# Patient Record
Sex: MALE | Race: WHITE | NOT HISPANIC OR LATINO | Employment: FULL TIME | URBAN - METROPOLITAN AREA
[De-identification: names, ages, dates, MRNs, and addresses within clinical notes are randomized per-mention and may not be internally consistent; named-entity substitution may affect disease eponyms.]

---

## 2017-03-20 ENCOUNTER — COMMUNICATION - HEALTHEAST (OUTPATIENT)
Dept: FAMILY MEDICINE | Facility: CLINIC | Age: 47
End: 2017-03-20

## 2017-03-24 ENCOUNTER — OFFICE VISIT - HEALTHEAST (OUTPATIENT)
Dept: FAMILY MEDICINE | Facility: CLINIC | Age: 47
End: 2017-03-24

## 2017-03-24 ENCOUNTER — RECORDS - HEALTHEAST (OUTPATIENT)
Dept: GENERAL RADIOLOGY | Facility: CLINIC | Age: 47
End: 2017-03-24

## 2017-03-24 DIAGNOSIS — M75.02 ADHESIVE CAPSULITIS OF LEFT SHOULDER: ICD-10-CM

## 2017-03-24 DIAGNOSIS — G89.29 OTHER CHRONIC PAIN: ICD-10-CM

## 2017-03-24 DIAGNOSIS — M25.512 PAIN IN LEFT SHOULDER: ICD-10-CM

## 2017-03-24 ASSESSMENT — MIFFLIN-ST. JEOR: SCORE: 2237.24

## 2017-03-27 ENCOUNTER — COMMUNICATION - HEALTHEAST (OUTPATIENT)
Dept: FAMILY MEDICINE | Facility: CLINIC | Age: 47
End: 2017-03-27

## 2017-04-21 ENCOUNTER — OFFICE VISIT - HEALTHEAST (OUTPATIENT)
Dept: PHYSICAL THERAPY | Facility: REHABILITATION | Age: 47
End: 2017-04-21

## 2017-04-21 DIAGNOSIS — M25.612 SHOULDER STIFFNESS, LEFT: ICD-10-CM

## 2017-04-21 DIAGNOSIS — R29.3 ABNORMAL POSTURE: ICD-10-CM

## 2017-04-21 DIAGNOSIS — M25.612 DECREASED RANGE OF MOTION OF SHOULDER, LEFT: ICD-10-CM

## 2017-04-21 DIAGNOSIS — M25.512 LEFT SHOULDER PAIN, UNSPECIFIED CHRONICITY: ICD-10-CM

## 2018-04-17 ENCOUNTER — TELEPHONE (OUTPATIENT)
Dept: OTHER | Facility: CLINIC | Age: 48
End: 2018-04-17

## 2018-04-17 NOTE — TELEPHONE ENCOUNTER
4/17/2018    Call Regarding Onboarding [facility] Other    Attempt 1    Message on voicemail     Comments:       Outreach   SV

## 2018-05-11 NOTE — TELEPHONE ENCOUNTER
5/11/2018    Call Regarding [facility]boarding [facility] [facility] other     Attempt 2    Message on voicemail    Comments:       Outreach   Smitha Ortega

## 2018-06-05 NOTE — TELEPHONE ENCOUNTER
06/05/2018      Call Regarding [facility] other    Attempt 3    Message on voicemail    Comments:       Outreach   Veronica Ramey

## 2018-10-30 ENCOUNTER — OFFICE VISIT - HEALTHEAST (OUTPATIENT)
Dept: FAMILY MEDICINE | Facility: CLINIC | Age: 48
End: 2018-10-30

## 2018-10-30 DIAGNOSIS — J39.2 LESION OF THROAT: ICD-10-CM

## 2020-01-02 ENCOUNTER — OFFICE VISIT - HEALTHEAST (OUTPATIENT)
Dept: FAMILY MEDICINE | Facility: CLINIC | Age: 50
End: 2020-01-02

## 2020-01-02 ENCOUNTER — COMMUNICATION - HEALTHEAST (OUTPATIENT)
Dept: TELEHEALTH | Facility: CLINIC | Age: 50
End: 2020-01-02

## 2020-01-02 DIAGNOSIS — M25.842 CYST OF JOINT OF LEFT HAND: ICD-10-CM

## 2020-01-02 ASSESSMENT — MIFFLIN-ST. JEOR: SCORE: 2262.19

## 2020-01-06 ENCOUNTER — RECORDS - HEALTHEAST (OUTPATIENT)
Dept: ADMINISTRATIVE | Facility: OTHER | Age: 50
End: 2020-01-06

## 2020-07-02 ENCOUNTER — TELEPHONE (OUTPATIENT)
Dept: TELEHEALTH | Age: 50
End: 2020-07-02

## 2020-07-02 ENCOUNTER — LAB SERVICES (OUTPATIENT)
Dept: LAB | Age: 50
End: 2020-07-02

## 2020-07-02 DIAGNOSIS — Z20.822 SUSPECTED COVID-19 VIRUS INFECTION: Primary | ICD-10-CM

## 2020-07-02 DIAGNOSIS — Z20.822 SUSPECTED COVID-19 VIRUS INFECTION: ICD-10-CM

## 2020-07-04 LAB — SARS-COV-2 N GENE RESP QL NAA+PROBE: NEGATIVE

## 2020-07-14 ENCOUNTER — TELEPHONE (OUTPATIENT)
Dept: TELEHEALTH | Age: 50
End: 2020-07-14

## 2020-07-14 DIAGNOSIS — Z20.822 SUSPECTED COVID-19 VIRUS INFECTION: Primary | ICD-10-CM

## 2020-07-16 ENCOUNTER — LAB SERVICES (OUTPATIENT)
Dept: LAB | Age: 50
End: 2020-07-16

## 2020-07-16 DIAGNOSIS — Z20.822 SUSPECTED COVID-19 VIRUS INFECTION: ICD-10-CM

## 2020-07-19 LAB — SARS-COV-2 N GENE RESP QL NAA+PROBE: NEGATIVE

## 2021-05-30 VITALS — BODY MASS INDEX: 40.23 KG/M2 | WEIGHT: 297 LBS | HEIGHT: 72 IN

## 2021-06-02 VITALS — BODY MASS INDEX: 43.73 KG/M2 | WEIGHT: 315 LBS

## 2021-06-03 VITALS
WEIGHT: 302.5 LBS | HEART RATE: 87 BPM | SYSTOLIC BLOOD PRESSURE: 124 MMHG | OXYGEN SATURATION: 95 % | BODY MASS INDEX: 40.97 KG/M2 | HEIGHT: 72 IN | DIASTOLIC BLOOD PRESSURE: 74 MMHG

## 2021-06-04 NOTE — PROGRESS NOTES
"Assessment/Plan:      Problem List Items Addressed This Visit     None      Visit Diagnoses     Cyst of joint of left hand    -  Primary    Relevant Orders    Ambulatory referral to Orthopedics      I am suspicious of a synovial cyst but may also be a tendon nodule. Given that the nodule is bothering the patient, I am recommending evaluation by an orthopedist. The nodule can't simply be removed here due to potential tendon/joint involvement.    Patient Instructions   I would recommend evaluation by an orthopedist. You should get a call to schedule.       Subjective:   Mauricio Thorpe is a 49 y.o. male who presents today complaining of a lump in the palm of his left hand for 8-9 months. It is increasingly bothersome and he would like it removed. No problems with moving his fingers. No numbness or tingling in the hand.    Patient Active Problem List   Diagnosis     Chronic left shoulder pain      History reviewed. No pertinent past medical history.  Past Surgical History:   Procedure Laterality Date     APPENDECTOMY         Review of Systems      Objective:     Vitals:    01/02/20 0948   BP: 124/74   Pulse: 87   SpO2: 95%   Weight: (!) 302 lb 8 oz (137.2 kg)   Height: 5' 11.5\" (1.816 m)   PainSc:   1   PainLoc: Hand       Physical Exam  Musculoskeletal:      Comments: Left hand: Normal  strength and range of motion of the fingers. There is a 1 cm nodule on the palmar surface proximal to the 4th digit. Nodule does not move with movement of the finger. No erythema or discomfort.       "

## 2021-06-09 NOTE — PROGRESS NOTES
"Assessment/Plan:  Mauricio was seen today for shoulder pain.    Diagnoses and all orders for this visit:    Adhesive capsulitis of left shoulder: The patient is a physical exam most consistent with adhesive capsulitis.  X-ray was normal/negative.  Recommending physical therapy and we did talk but did not initiate an injection of corticosteroids into the joint.  The patient due to his travel is unlikely to be able to perform physical therapy more than once or twice.  Went through exercise that he can complete at home which might be helpful.  He is not making good progress, consider referral to orthopedics or insist upon more diligence with his physical therapy.  -     XR Shoulder Left 2 or More VWS; Future  -     Ambulatory referral to Physical Therapy    Return in about 3 months (around 6/24/2017) for Annual physical.    Michele Sheffield MD  _______________________________    Chief Complaint   Patient presents with     Shoulder Pain     left shoulder x 3 months      Subjective: Mauricio Thorpe is a 47 y.o. year old male who I have not seen in clinic before who presents with the following acute complaint(s):    Shoulder pain:   - duration: three months   - left shoulder   - cannot move it above head   - No obvious injury   - not getting better   - can loosen it up   - palliative: unsure   - no weakness.   - work: .      ROS: Complete review of systems obtained.  Pertinent items are listed above.     The following portions of the patient's history were reviewed and updated as appropriate: allergies, current medications, past family history, past medical history, past social history, past surgical history and problem list.    Objective:    height is 5' 11.5\" (1.816 m) and weight is 297 lb (134.7 kg) (abnormal). His oral temperature is 98.6  F (37  C). His blood pressure is 118/64 and his pulse is 84. His respiration is 26 and oxygen saturation is 98%.   General: No acute distress  MSK: " Both active and passive range of motion is diminished on the left side.  As I try to manipulate his left shoulder there is resistance that is painful to the patient.  There is no tenderness to palpation over the scapular ridge bilaterally.  No tenderness to palpation over the clavicle or the AC joint bilaterally.  Proximal humeral head insertion of the biceps tendon is nontender bilaterally.  Strength is 5 out of 5 bilaterally.  Radial pulses are normal bilaterally.  Reflexes 2+ at the biceps bilaterally.  Positive Steward.  Positive Neer's.  Positive empty can testing.  No pain with internal/external rotation.  psych: Flat affect.    Shoulder x-ray: Left shoulder does not show any fracture.  Appears well located with respect to the glenoid fossa.  No abnormalities by my personal interpretation.    No results found for this or any previous visit (from the past 24 hour(s)).  Xr Shoulder Left 2 Or More Vws    Result Date: 3/24/2017  XR SHOULDER LEFT 2 OR MORE VWS 3/24/2017 4:35 PM INDICATION: Pain in left shoulder  lt shoulder pn when abducting and drawing lt arm behind back x3wks. No known injury or any prev injury in past. COMPARISON: None. FINDINGS: Normal alignment without evidence of a fracture or dislocation. Adjacent chest wall is unremarkable. Crohn the humeral space and AC joint are normal. This report was electronically interpreted by: Dr. Maitlde Chester MD ON 03/24/2017 at 17:01    Additional History from Old Records Summarized (2): no  Decision to Obtain Records (1): no  Radiology Tests Summarized or Ordered (1): yes  Labs Reviewed or Ordered (1): no  Medicine Test Summarized or Ordered (1): no  Independent Review of EKG or X-RAY(2 each): yes    This note has been dictated using voice recognition software. Any grammatical or context distortions are unintentional and inherent to the software

## 2021-06-10 NOTE — PROGRESS NOTES
[facility]   Shoulder Initial Evaluation    Patient Name: Mauricio Thorpe  Date of evaluation: 4/21/2017  Referral Diagnosis: Adhesive capsulitis of left shoulder  Referring provider: Michele Sheffield MD  Visit Diagnosis:     ICD-10-CM    1. Left shoulder pain, unspecified chronicity M25.512    2. Decreased range of motion of shoulder, left M25.612    3. Shoulder stiffness, left M25.612    4. Abnormal posture R29.3        Assessment:     Mauricio Thorpe is a 47 y.o. male who presents to therapy today with chief complaints of L shoulder pain and stiffness that has been ongoing since December of 2016 with an insidious onset. Pt has significant limitations with L shoulder ROM with capsular pattern most affected (ER, abduction, IR). ROM has improved since beginning stretching routine about a month ago. Pt demonstrates functional strength within his ROM and no significant pain with shoulder special tests. Active and passive ROM were both limited showing signs of adhesive capsulitis. Pt's work schedule will limit PT, but educated the importance of his stretches for improvements to be made. Pt's overall function is limited due to the above impairments and would benefit from PT for furthering increasing shoulder ROM. Pt reports no other significant past medical history.  Pain symptoms are improving since doctor visit.  Functional impairments include reaching, lifting, sleeping, bathing, dressing, ADLs, and bed mobility.  Pt demo's signs and sx consistent with adhesive capsulitis.     Impairments in  pain, posture, ROM, joint mobility, strength, ADL's  The POC is dynamic and will be modified on an ongoing basis.  Patient will return to clinic if symptoms persist.  Barriers to achieving goals as noted in the assessment section may affect outcome.  Prognosis to achieve goals is  good   Skilled PT is required to improve shoulder ROM, mobility, strength, posture, ADL function, and reduce pain.  Pt. is  appropriate for skilled PT intervention as outlined in the Plan of Care (POC).  Pt. is a good candidate for skilled PT services to improve pain levels and function.    Goals:  Pt. will have improved quality of sleep: with less pain;waking less times/night;in 6 weeks  Pt will: be independent with HEP within 3 weeks.  Pt will: be able to reach overhead to 120 degrees or more pain-free within 8 weeks.  Pt will: decrease SPADI by 9 points to demonstrate improved function within 8 weeks.  Pt will: be able to dress and bathe with less difficulty and 2/10 pain or less within 8 weeks.    Patient's expectations/goals are realistic.    Barriers to Learning or Achieving Goals:  No Barriers.       Plan / Patient Instructions:        Plan of Care:   Communication with: Referral Source  Patient Related Instruction: Nature of Condition;Body mechanics;Posture;Treatment plan and rationale;Precautions;Next steps;Self Care instruction;Expected outcome  Times per Week: 1  Number of Weeks: 3-4  Number of Visits: 3-4  Discharge Planning: Pt will be discharged when all goals are met, lack of progress or wosening condition, MD referral, and/or pt desires to continue with HEP independently   Precautions / Restrictions : none  Therapeutic Exercise: ROM;Stretching;Strengthening  Neuromuscular Reeducation: posture;core;kinesio tape  Manual Therapy: soft tissue mobilization;myofascial release;joint mobilization;muscle energy;strain counterstrain  Modalities: electrical stimulation;TENS;iontophoresis;ultrasound;cold pack;hot pack  Functional Training (ADL's): self care;meal prep;ADL's;ergonomics;instructions for equipment;safety procedures;instructions in transfers  Equipment: theraband;TENS unit    POC and pathology of condition were reviewed with patient.  Pt. is in agreement with the Plan of Care  A Home Exercise Program (HEP) was initiated today.  Pt. was instructed in exercises by PT and patient was given a handout with detailed  "instructions.  Plan for next visit: review HEP, continue GH mobilizations as able, shoulder stretching, postural stretching and strengthening, doorway pec stretch  .   Subjective:    Social information:   Occupation: electric controlengineer   Work Status:Working full time   Equipment Available: None    History of Present Illness:    Mauricio is a 47 y.o. male who presents to therapy today with complaints of L shoulder pain and stiffness that has been worsening since 2015. Pt does not recall an injury. Pt says that he has been doing some stretching that do help with some of his pain. Symptoms are intermittent and getting better. He denies history of similar symptoms. He describes their previous level of function as not limited.    Pain Ratin  Pain rating at best: 0  Pain rating at worst: 10  Pain description: sharp and stiffness, \"worst pain I have ever felt in my life\"    Functional limitations are described as occurring with:   bed mobility  lifting  personal cares donning shirt or jacket and washing body  reaching at shoulder height, overhead and behind back  performing routine daily activities  sleeping    Patient reports benefit from:  stretching       Objective:      Note: Items left blank indicates the item was not performed or not indicated at the time of the evaluation.    Patient Outcome Measures :    Shoulder Pain and Disability Index (SPADI) in %: 39   Scores range from 0-100%, where a score of 0% represents minimal pain and maximal function. The minimal clincically important difference is a score reduction of 10%.    Shoulder Examination  1. Left shoulder pain, unspecified chronicity     2. Decreased range of motion of shoulder, left     3. Shoulder stiffness, left     4. Abnormal posture       Involved side: Left  Posture Observation:      General sitting posture is  poor.  General standing posture is poor.  Cervical:  Mild forward head  Cervical Clearing: Normal    Shoulder/Elbow ROM  "   Date: 4/21/17     Shoulder and Elbow ROM ( )   AROM in degrees AROM in degrees AROM in degrees    Right Left Right Left Right Left   Shoulder Flexion (0-180 )  deg       Shoulder Abduction (0-180 ) WFL 90 deg       Shoulder Extension (0-60 )         Shoulder ER (0-90 )  5 deg       Shoulder IR (0-70 )  22 deg       Shoulder IR/Ext T8 L2       Elbow Flexion (150 ) WFL WFL       Elbow Extension (0 ) WFL WFL        PROM in degrees PROM in degrees PROM in degrees    Right Left Right Left Right Left   Shoulder Flexion (0-180 )  120 deg       Shoulder Abduction (0-180 )  100 deg       Shoulder Extension (0-60 )         Shoulder ER (0-90 )  8 deg       Shoulder IR (0-70 )  25 deg       Elbow Flexion (150 )         Elbow Extension (0 )           Shoulder/Elbow Strength   Date: 4/21/17     Shoulder/Elbow Strength (/5)  Manual Muscle Test (MMT) MMT MMT MMT    Right Left Right Left Right Left   Shoulder Flexion 5 5       Supraspinatus 5 5       Shoulder Abduction 5 5-       Shoulder Extension         Shoulder External Rotation 5 5       Shoulder Internal Rotation 5 5       Elbow Flexion 5 5       Elbow Extension 5 5       Other:         Other:           Flexibility: significant stiffness in L shoulder, most decreased in IR/ER and abduction    Palpation: no tenderness    Joint Assessment:  Sternoclavicular Joint Assessment: WNL  Acromioclavicular Joint Assessment: Not Indicated  Scapulothoracic Joint Assessment: WNL.  Glenohumeral Joint Assessment:Hypomobile. Pt had difficulty relaxing during assessment    Shoulder Special Tests     Impingement Cluster Right (+/-) Left (+/-) Rotator Cuff Tests Right (+/-) Left (+/-)   Steward-Roberto  - Drop Arm Sign     Painful Arc  + Hornblowers     Infraspinatus Test   ERLS     AC Tests Right (+/-) Left (+/-) IRLS     Active Compression   Labral Tests Right (+/-) Left (+/-)   Crossbody Adduction   Biceps Load Test II     AC Resisted Extension   Jerk Test     GH Instability Tests  Right (+/-) Left (+/-) Deisy Test     Sulcus Sign   Biceps Tests Right (+/-) Left (+/-)   Apprehension   Speed     Relocation   Trudi martinez     Other:   Other:     Other:   Other:         Treatment Today    TREATMENT MINUTES COMMENTS   Evaluation 30    Self-care/ Home management     Manual therapy 15 In supine:  - grade 3 and 4 inferior and posterior L GH mobilizations for mobility  - GH distraction for relaxation, pt had difficulty relaxing with mobilizations  - PROM of L flexion, abduction, IR/ER   Neuromuscular Re-education     Therapeutic Activity     Therapeutic Exercises 10 See exercises for HEP. Pt educated on the importance of HEP and normal stretching. May trial ice or heat for comfort. Also, told to try getting pulley system for ROM.   Gait training     Modality__________________                Total 55    Blank areas are intentional and mean the treatment did not include these items.     PT Evaluation Code: (Please list factors)  Patient History/Comorbidities: none  Examination: L shoulder pain and stiffness, poor posture, muscle tightness  Clinical Presentation: stable  Clinical Decision Making: low    Patient History/  Comorbidities Examination  (body structures and functions, activity limitations, and/or participation restrictions) Clinical Presentation Clinical Decision Making (Complexity)   No documented Comorbidities or personal factors 1-2 Elements Stable and/or uncomplicated Low   1-2 documented comorbidities or personal factor 3 Elements Evolving clinical presentation with changing characteristics Moderate   3-4 documented comorbidities or personal factors 4 or more Unstable and unpredictable High          Gurpreet Rutledge, PT ,DPT  4/21/2017  2:45 PM

## 2021-06-11 NOTE — PROGRESS NOTES
[facility] Discharge Summary  Patient Name: Mauricio Thorpe  Date: 6/23/2017  Referral Diagnosis: Adhesive capsulitis of left shoulder  Referring provider: Michele Sheffield MD  Visit Diagnosis:   1. Left shoulder pain, unspecified chronicity     2. Decreased range of motion of shoulder, left     3. Shoulder stiffness, left     4. Abnormal posture         Goals:  Pt. will have improved quality of sleep: with less pain;waking less times/night;in 6 weeks  Pt will: be independent with HEP within 3 weeks. (Unable to determine)  Pt will: be able to reach overhead to 120 degrees or more pain-free within 8 weeks. (Unable to determine)  Pt will: decrease SPADI by 9 points to demonstrate improved function within 8 weeks. (Unable to determine)  Pt will: be able to dress and bathe with less difficulty and 2/10 pain or less within 8 weeks. (Unable to determine.)    Patient was seen for one visit on 4/21/17 and did not return to PT.  Patient received a home program   The patient discontinued therapy, did not return.  No further therapy is required at this time.  Pt limited with PT due to work schedule and did not return after given several exercises for HEP.    Therapy will be discontinued at this time.  The patient will need a new referral to resume.    Thank you for your referral.  Gurpreet Rutledge, PT, DPT  6/23/2017  2:41 PM

## 2021-06-15 PROBLEM — G89.29 CHRONIC LEFT SHOULDER PAIN: Status: ACTIVE | Noted: 2017-03-24

## 2021-06-15 PROBLEM — M25.512 CHRONIC LEFT SHOULDER PAIN: Status: ACTIVE | Noted: 2017-03-24

## 2021-06-21 NOTE — PROGRESS NOTES
Assessment/Plan:      Problem List Items Addressed This Visit     None      Visit Diagnoses     Lesion of throat    -  Primary      Lesion appears to be a small cyst or other similar pathology.  It has a benign appearance.  Given that it has been there for several months and actually seems smaller to the patient, no further workup needed at this time.  Patient reassured.  He was asked to return if it is increasing in size or if it is bleeding.  I did offer referral to ENT if he would like but he deferred at this time.    Subjective:   Mauricio Thorpe is a 48 y.o. male who presents today complaining of a lump he noticed in his posterior left tonsillar bed.  He first noticed it in June of this year.  It doesn't hurt. It actually seems a bit smaller now than it was this summer.  No history of smoking or chewing tobacco use.  The area is not bleeding.    Patient Active Problem List   Diagnosis     Chronic left shoulder pain      No past medical history on file.  Past Surgical History:   Procedure Laterality Date     APPENDECTOMY             Review of Systems  Pertinent items are noted in HPI.  ROS otherwise negative.    Objective:     Vitals:    10/30/18 1301   BP: 136/80   Pulse: 94   Temp: 98.1  F (36.7  C)   TempSrc: Oral   Weight: (!) 318 lb (144.2 kg)       Physical Exam   Constitutional: He appears well-nourished. No distress.   HENT:   Tonsillar beds appear normal other than a small cystic lesion on his left tonsillar bed.  Well-circumscribed and appears benign.   Neck: Normal range of motion. Neck supple.   Cardiovascular: Normal rate and regular rhythm.    No murmur heard.  Pulmonary/Chest: Effort normal and breath sounds normal. No respiratory distress. He has no wheezes. He has no rales.   Lymphadenopathy:     He has no cervical adenopathy.

## 2021-07-23 ENCOUNTER — HOSPITAL ENCOUNTER (EMERGENCY)
Age: 51
Discharge: HOME | End: 2021-07-24
Attending: EMERGENCY MEDICINE

## 2021-07-23 ENCOUNTER — APPOINTMENT (OUTPATIENT)
Dept: CT IMAGING | Age: 51
End: 2021-07-23
Attending: EMERGENCY MEDICINE

## 2021-07-23 ENCOUNTER — APPOINTMENT (OUTPATIENT)
Dept: GENERAL RADIOLOGY | Age: 51
End: 2021-07-23
Attending: EMERGENCY MEDICINE

## 2021-07-23 DIAGNOSIS — S40.012A CONTUSION OF LEFT SHOULDER, INITIAL ENCOUNTER: ICD-10-CM

## 2021-07-23 DIAGNOSIS — R55 SYNCOPE AND COLLAPSE: ICD-10-CM

## 2021-07-23 DIAGNOSIS — S00.81XA ABRASION OF FACE, INITIAL ENCOUNTER: ICD-10-CM

## 2021-07-23 DIAGNOSIS — S01.81XA LACERATION OF FOREHEAD, INITIAL ENCOUNTER: Primary | ICD-10-CM

## 2021-07-23 DIAGNOSIS — E86.0 DEHYDRATION: ICD-10-CM

## 2021-07-23 LAB
ANION GAP SERPL CALC-SCNC: 16 MMOL/L (ref 10–20)
BASOPHILS # BLD: 0 K/MCL (ref 0–0.3)
BASOPHILS NFR BLD: 0 %
BUN SERPL-MCNC: 25 MG/DL (ref 6–20)
BUN/CREAT SERPL: 16 (ref 7–25)
CALCIUM SERPL-MCNC: 9.8 MG/DL (ref 8.4–10.2)
CHLORIDE SERPL-SCNC: 103 MMOL/L (ref 98–107)
CO2 SERPL-SCNC: 28 MMOL/L (ref 21–32)
CREAT SERPL-MCNC: 1.57 MG/DL (ref 0.67–1.17)
DEPRECATED RDW RBC: 42.7 FL (ref 39–50)
EOSINOPHIL # BLD: 0.2 K/MCL (ref 0–0.5)
EOSINOPHIL NFR BLD: 2 %
ERYTHROCYTE [DISTWIDTH] IN BLOOD: 13 % (ref 11–15)
FASTING DURATION TIME PATIENT: ABNORMAL H
GFR SERPLBLD BASED ON 1.73 SQ M-ARVRAT: 51 ML/MIN/1.73M2
GLUCOSE SERPL-MCNC: 95 MG/DL (ref 65–99)
HCT VFR BLD CALC: 46 % (ref 39–51)
HGB BLD-MCNC: 15.4 G/DL (ref 13–17)
IMM GRANULOCYTES # BLD AUTO: 0 K/MCL (ref 0–0.2)
IMM GRANULOCYTES # BLD: 0 %
LYMPHOCYTES # BLD: 2.1 K/MCL (ref 1–4.8)
LYMPHOCYTES NFR BLD: 19 %
MAGNESIUM SERPL-MCNC: 2.4 MG/DL (ref 1.7–2.4)
MCH RBC QN AUTO: 30.4 PG (ref 26–34)
MCHC RBC AUTO-ENTMCNC: 33.5 G/DL (ref 32–36.5)
MCV RBC AUTO: 90.9 FL (ref 78–100)
MONOCYTES # BLD: 0.8 K/MCL (ref 0.3–0.9)
MONOCYTES NFR BLD: 7 %
NEUTROPHILS # BLD: 7.8 K/MCL (ref 1.8–7.7)
NEUTROPHILS NFR BLD: 72 %
NRBC BLD MANUAL-RTO: 0 /100 WBC
PLATELET # BLD AUTO: 229 K/MCL (ref 140–450)
POTASSIUM SERPL-SCNC: 3.8 MMOL/L (ref 3.4–5.1)
RBC # BLD: 5.06 MIL/MCL (ref 4.5–5.9)
SODIUM SERPL-SCNC: 143 MMOL/L (ref 135–145)
TROPONIN I SERPL HS-MCNC: <0.02 NG/ML
TROPONIN I SERPL HS-MCNC: <0.02 NG/ML
WBC # BLD: 10.9 K/MCL (ref 4.2–11)

## 2021-07-23 PROCEDURE — 83735 ASSAY OF MAGNESIUM: CPT | Performed by: EMERGENCY MEDICINE

## 2021-07-23 PROCEDURE — 36415 COLL VENOUS BLD VENIPUNCTURE: CPT

## 2021-07-23 PROCEDURE — 10002807 HB RX 258: Performed by: EMERGENCY MEDICINE

## 2021-07-23 PROCEDURE — 84484 ASSAY OF TROPONIN QUANT: CPT | Performed by: EMERGENCY MEDICINE

## 2021-07-23 PROCEDURE — 96360 HYDRATION IV INFUSION INIT: CPT

## 2021-07-23 PROCEDURE — 10002801 HB RX 250 W/O HCPCS: Performed by: EMERGENCY MEDICINE

## 2021-07-23 PROCEDURE — 90471 IMMUNIZATION ADMIN: CPT | Performed by: EMERGENCY MEDICINE

## 2021-07-23 PROCEDURE — 99285 EMERGENCY DEPT VISIT HI MDM: CPT | Performed by: EMERGENCY MEDICINE

## 2021-07-23 PROCEDURE — 10002800 HB RX 250 W HCPCS: Performed by: EMERGENCY MEDICINE

## 2021-07-23 PROCEDURE — 90715 TDAP VACCINE 7 YRS/> IM: CPT | Performed by: EMERGENCY MEDICINE

## 2021-07-23 PROCEDURE — 71045 X-RAY EXAM CHEST 1 VIEW: CPT

## 2021-07-23 PROCEDURE — G1004 CDSM NDSC: HCPCS | Performed by: RADIOLOGY

## 2021-07-23 PROCEDURE — G1004 CDSM NDSC: HCPCS

## 2021-07-23 PROCEDURE — 96361 HYDRATE IV INFUSION ADD-ON: CPT

## 2021-07-23 PROCEDURE — 80048 BASIC METABOLIC PNL TOTAL CA: CPT | Performed by: EMERGENCY MEDICINE

## 2021-07-23 PROCEDURE — 71045 X-RAY EXAM CHEST 1 VIEW: CPT | Performed by: RADIOLOGY

## 2021-07-23 PROCEDURE — 73030 X-RAY EXAM OF SHOULDER: CPT

## 2021-07-23 PROCEDURE — 85025 COMPLETE CBC W/AUTO DIFF WBC: CPT | Performed by: EMERGENCY MEDICINE

## 2021-07-23 PROCEDURE — 99285 EMERGENCY DEPT VISIT HI MDM: CPT

## 2021-07-23 PROCEDURE — 93005 ELECTROCARDIOGRAM TRACING: CPT | Performed by: EMERGENCY MEDICINE

## 2021-07-23 PROCEDURE — 70450 CT HEAD/BRAIN W/O DYE: CPT | Performed by: RADIOLOGY

## 2021-07-23 PROCEDURE — 70450 CT HEAD/BRAIN W/O DYE: CPT

## 2021-07-23 PROCEDURE — 73030 X-RAY EXAM OF SHOULDER: CPT | Performed by: RADIOLOGY

## 2021-07-23 PROCEDURE — 93010 ELECTROCARDIOGRAM REPORT: CPT | Performed by: EMERGENCY MEDICINE

## 2021-07-23 PROCEDURE — 10002526 HB LACERATION REPAIR-SIMPLE

## 2021-07-23 RX ORDER — LIDOCAINE HYDROCHLORIDE AND EPINEPHRINE 10; 10 MG/ML; UG/ML
1 INJECTION, SOLUTION INFILTRATION; PERINEURAL ONCE
Status: COMPLETED | OUTPATIENT
Start: 2021-07-23 | End: 2021-07-23

## 2021-07-23 RX ADMIN — LIDOCAINE HYDROCHLORIDE,EPINEPHRINE BITARTRATE 1 ML: 10; .01 INJECTION, SOLUTION INFILTRATION; PERINEURAL at 22:16

## 2021-07-23 RX ADMIN — SODIUM CHLORIDE, POTASSIUM CHLORIDE, SODIUM LACTATE AND CALCIUM CHLORIDE 1000 ML: 600; 310; 30; 20 INJECTION, SOLUTION INTRAVENOUS at 21:38

## 2021-07-23 RX ADMIN — TETANUS TOXOID, REDUCED DIPHTHERIA TOXOID AND ACELLULAR PERTUSSIS VACCINE, ADSORBED 0.5 ML: 5; 2.5; 8; 8; 2.5 SUSPENSION INTRAMUSCULAR at 21:38

## 2021-07-23 RX ADMIN — SODIUM CHLORIDE, POTASSIUM CHLORIDE, SODIUM LACTATE AND CALCIUM CHLORIDE 1000 ML: 600; 310; 30; 20 INJECTION, SOLUTION INTRAVENOUS at 20:05

## 2021-07-23 ASSESSMENT — HEART SCORE
TROPONIN: EQUAL OR LESS THAN NORMAL LIMIT
RISK FACTORS: 1-2 RISK FACTORS
HISTORY: SLIGHTLY SUSPICIOUS
HEART SCORE: 2
EKG: NORMAL
AGE: GREATER THAN 45 TO LESS THAN 65

## 2021-07-23 ASSESSMENT — PAIN SCALES - GENERAL: PAINLEVEL_OUTOF10: 5

## 2021-07-23 ASSESSMENT — PAIN DESCRIPTION - PAIN TYPE: TYPE: ACUTE PAIN

## 2021-07-24 LAB
RAINBOW EXTRA TUBES HOLD SPECIMEN: NORMAL

## 2021-07-26 ENCOUNTER — WALK IN (OUTPATIENT)
Dept: URGENT CARE | Age: 51
End: 2021-07-26

## 2021-07-26 VITALS
HEART RATE: 94 BPM | WEIGHT: 140.4 LBS | DIASTOLIC BLOOD PRESSURE: 68 MMHG | RESPIRATION RATE: 14 BRPM | SYSTOLIC BLOOD PRESSURE: 116 MMHG | BODY MASS INDEX: 24.1 KG/M2 | TEMPERATURE: 98.1 F | OXYGEN SATURATION: 99 %

## 2021-07-26 DIAGNOSIS — S06.0X1D CONCUSSION WITH LOSS OF CONSCIOUSNESS OF 30 MINUTES OR LESS, SUBSEQUENT ENCOUNTER: Primary | ICD-10-CM

## 2021-07-26 LAB
ATRIAL RATE (BPM): 61
DIASTOLIC BLOOD PRESSURE: 92
P AXIS (DEGREES): 71
PR-INTERVAL (MSEC): 142
QRS-INTERVAL (MSEC): 108
QT-INTERVAL (MSEC): 434
QTC: 437
R AXIS (DEGREES): 71
REPORT TEXT: NORMAL
SYSTOLIC BLOOD PRESSURE: 131
T AXIS (DEGREES): 58
VENTRICULAR RATE EKG/MIN (BPM): 61

## 2021-07-26 PROCEDURE — 99214 OFFICE O/P EST MOD 30 MIN: CPT | Performed by: PHYSICIAN ASSISTANT

## 2021-07-30 ENCOUNTER — WALK IN (OUTPATIENT)
Dept: URGENT CARE | Age: 51
End: 2021-07-30

## 2021-07-30 VITALS
SYSTOLIC BLOOD PRESSURE: 112 MMHG | RESPIRATION RATE: 16 BRPM | HEART RATE: 84 BPM | HEIGHT: 64 IN | BODY MASS INDEX: 24.1 KG/M2 | DIASTOLIC BLOOD PRESSURE: 62 MMHG

## 2021-07-30 DIAGNOSIS — Z48.02: Primary | ICD-10-CM

## 2021-07-30 PROCEDURE — 99213 OFFICE O/P EST LOW 20 MIN: CPT | Performed by: PHYSICIAN ASSISTANT

## 2021-08-10 ENCOUNTER — TELEPHONE (OUTPATIENT)
Dept: URGENT CARE | Age: 51
End: 2021-08-10

## 2021-08-17 ENCOUNTER — HOSPITAL ENCOUNTER (EMERGENCY)
Age: 51
Discharge: HOME | End: 2021-08-17
Attending: STUDENT IN AN ORGANIZED HEALTH CARE EDUCATION/TRAINING PROGRAM

## 2021-08-17 ENCOUNTER — TELEPHONE (OUTPATIENT)
Dept: NEUROLOGY | Age: 51
End: 2021-08-17

## 2021-08-17 VITALS
DIASTOLIC BLOOD PRESSURE: 77 MMHG | WEIGHT: 140.87 LBS | RESPIRATION RATE: 16 BRPM | TEMPERATURE: 97.8 F | BODY MASS INDEX: 23.47 KG/M2 | OXYGEN SATURATION: 99 % | HEART RATE: 73 BPM | HEIGHT: 65 IN | SYSTOLIC BLOOD PRESSURE: 120 MMHG

## 2021-08-17 DIAGNOSIS — F07.81 POST CONCUSSIVE SYNDROME: Primary | ICD-10-CM

## 2021-08-17 DIAGNOSIS — S09.90XS TRAUMATIC INJURY OF HEAD, SEQUELA: Primary | ICD-10-CM

## 2021-08-17 LAB
ALBUMIN SERPL-MCNC: 3.6 G/DL (ref 3.6–5.1)
ALBUMIN/GLOB SERPL: 1.1 {RATIO} (ref 1–2.4)
ALP SERPL-CCNC: 67 UNITS/L (ref 45–117)
ALT SERPL-CCNC: 25 UNITS/L
ANION GAP SERPL CALC-SCNC: 12 MMOL/L (ref 10–20)
APPEARANCE UR: CLEAR
AST SERPL-CCNC: 19 UNITS/L
BASOPHILS # BLD: 0 K/MCL (ref 0–0.3)
BASOPHILS NFR BLD: 1 %
BILIRUB SERPL-MCNC: 0.4 MG/DL (ref 0.2–1)
BILIRUB UR QL STRIP: NEGATIVE
BUN SERPL-MCNC: 21 MG/DL (ref 6–20)
BUN/CREAT SERPL: 15 (ref 7–25)
CALCIUM SERPL-MCNC: 8.2 MG/DL (ref 8.4–10.2)
CHLORIDE SERPL-SCNC: 104 MMOL/L (ref 98–107)
CK SERPL-CCNC: 233 UNITS/L (ref 39–308)
CO2 SERPL-SCNC: 28 MMOL/L (ref 21–32)
COLOR UR: YELLOW
CREAT SERPL-MCNC: 1.39 MG/DL (ref 0.67–1.17)
DEPRECATED RDW RBC: 44.5 FL (ref 39–50)
EOSINOPHIL # BLD: 0.2 K/MCL (ref 0–0.5)
EOSINOPHIL NFR BLD: 3 %
ERYTHROCYTE [DISTWIDTH] IN BLOOD: 13.1 % (ref 11–15)
FASTING DURATION TIME PATIENT: ABNORMAL H
GFR SERPLBLD BASED ON 1.73 SQ M-ARVRAT: 59 ML/MIN/1.73M2
GLOBULIN SER-MCNC: 3.2 G/DL (ref 2–4)
GLUCOSE SERPL-MCNC: 99 MG/DL (ref 65–99)
GLUCOSE UR STRIP-MCNC: NEGATIVE MG/DL
HCT VFR BLD CALC: 44.6 % (ref 39–51)
HGB BLD-MCNC: 14.7 G/DL (ref 13–17)
HGB UR QL STRIP: NEGATIVE
IMM GRANULOCYTES # BLD AUTO: 0 K/MCL (ref 0–0.2)
IMM GRANULOCYTES # BLD: 0 %
KETONES UR STRIP-MCNC: NEGATIVE MG/DL
LEUKOCYTE ESTERASE UR QL STRIP: NEGATIVE
LYMPHOCYTES # BLD: 2.6 K/MCL (ref 1–4.8)
LYMPHOCYTES NFR BLD: 35 %
MAGNESIUM SERPL-MCNC: 1.9 MG/DL (ref 1.7–2.4)
MCH RBC QN AUTO: 30.5 PG (ref 26–34)
MCHC RBC AUTO-ENTMCNC: 33 G/DL (ref 32–36.5)
MCV RBC AUTO: 92.5 FL (ref 78–100)
MONOCYTES # BLD: 0.6 K/MCL (ref 0.3–0.9)
MONOCYTES NFR BLD: 8 %
NEUTROPHILS # BLD: 4 K/MCL (ref 1.8–7.7)
NEUTROPHILS NFR BLD: 53 %
NITRITE UR QL STRIP: NEGATIVE
NRBC BLD MANUAL-RTO: 0 /100 WBC
PH UR STRIP: 5 [PH] (ref 5–7)
PLATELET # BLD AUTO: 212 K/MCL (ref 140–450)
POTASSIUM SERPL-SCNC: 4.6 MMOL/L (ref 3.4–5.1)
PROT SERPL-MCNC: 6.8 G/DL (ref 6.4–8.2)
PROT UR STRIP-MCNC: NEGATIVE MG/DL
RAINBOW EXTRA TUBES HOLD SPECIMEN: NORMAL
RBC # BLD: 4.82 MIL/MCL (ref 4.5–5.9)
SODIUM SERPL-SCNC: 139 MMOL/L (ref 135–145)
SP GR UR STRIP: 1.03 (ref 1–1.03)
TROPONIN I SERPL HS-MCNC: <0.02 NG/ML
TSH SERPL-ACNC: 1.22 MCUNITS/ML (ref 0.35–5)
UROBILINOGEN UR STRIP-MCNC: 0.2 MG/DL
WBC # BLD: 7.5 K/MCL (ref 4.2–11)

## 2021-08-17 PROCEDURE — 93010 ELECTROCARDIOGRAM REPORT: CPT | Performed by: STUDENT IN AN ORGANIZED HEALTH CARE EDUCATION/TRAINING PROGRAM

## 2021-08-17 PROCEDURE — 83735 ASSAY OF MAGNESIUM: CPT | Performed by: STUDENT IN AN ORGANIZED HEALTH CARE EDUCATION/TRAINING PROGRAM

## 2021-08-17 PROCEDURE — 84443 ASSAY THYROID STIM HORMONE: CPT | Performed by: STUDENT IN AN ORGANIZED HEALTH CARE EDUCATION/TRAINING PROGRAM

## 2021-08-17 PROCEDURE — 85025 COMPLETE CBC W/AUTO DIFF WBC: CPT | Performed by: STUDENT IN AN ORGANIZED HEALTH CARE EDUCATION/TRAINING PROGRAM

## 2021-08-17 PROCEDURE — 96360 HYDRATION IV INFUSION INIT: CPT

## 2021-08-17 PROCEDURE — 93005 ELECTROCARDIOGRAM TRACING: CPT | Performed by: STUDENT IN AN ORGANIZED HEALTH CARE EDUCATION/TRAINING PROGRAM

## 2021-08-17 PROCEDURE — 99284 EMERGENCY DEPT VISIT MOD MDM: CPT | Performed by: STUDENT IN AN ORGANIZED HEALTH CARE EDUCATION/TRAINING PROGRAM

## 2021-08-17 PROCEDURE — 36415 COLL VENOUS BLD VENIPUNCTURE: CPT

## 2021-08-17 PROCEDURE — 10002807 HB RX 258: Performed by: STUDENT IN AN ORGANIZED HEALTH CARE EDUCATION/TRAINING PROGRAM

## 2021-08-17 PROCEDURE — 99284 EMERGENCY DEPT VISIT MOD MDM: CPT

## 2021-08-17 PROCEDURE — 82550 ASSAY OF CK (CPK): CPT | Performed by: STUDENT IN AN ORGANIZED HEALTH CARE EDUCATION/TRAINING PROGRAM

## 2021-08-17 PROCEDURE — 81003 URINALYSIS AUTO W/O SCOPE: CPT | Performed by: STUDENT IN AN ORGANIZED HEALTH CARE EDUCATION/TRAINING PROGRAM

## 2021-08-17 PROCEDURE — 96361 HYDRATE IV INFUSION ADD-ON: CPT

## 2021-08-17 PROCEDURE — 80053 COMPREHEN METABOLIC PANEL: CPT | Performed by: STUDENT IN AN ORGANIZED HEALTH CARE EDUCATION/TRAINING PROGRAM

## 2021-08-17 PROCEDURE — 84484 ASSAY OF TROPONIN QUANT: CPT | Performed by: STUDENT IN AN ORGANIZED HEALTH CARE EDUCATION/TRAINING PROGRAM

## 2021-08-17 RX ADMIN — SODIUM CHLORIDE 1000 ML: 9 INJECTION, SOLUTION INTRAVENOUS at 11:50

## 2021-08-17 ASSESSMENT — ENCOUNTER SYMPTOMS
ABDOMINAL PAIN: 0
NAUSEA: 0
SHORTNESS OF BREATH: 0
CONSTIPATION: 0
FEVER: 0
HEADACHES: 0
DIZZINESS: 1
VOMITING: 0
BACK PAIN: 0
RHINORRHEA: 0
DIARRHEA: 0
CHILLS: 0
LIGHT-HEADEDNESS: 0
COUGH: 0
SORE THROAT: 0

## 2021-08-17 ASSESSMENT — PAIN SCALES - GENERAL: PAINLEVEL_OUTOF10: 0

## 2021-08-18 LAB
ATRIAL RATE (BPM): 69
DIASTOLIC BLOOD PRESSURE: 80
P AXIS (DEGREES): 72
PR-INTERVAL (MSEC): 154
QRS-INTERVAL (MSEC): 110
QT-INTERVAL (MSEC): 396
QTC: 424
R AXIS (DEGREES): 71
REPORT TEXT: NORMAL
SYSTOLIC BLOOD PRESSURE: 124
T AXIS (DEGREES): 67
VENTRICULAR RATE EKG/MIN (BPM): 69

## 2021-08-19 ASSESSMENT — ENCOUNTER SYMPTOMS: WEAKNESS: 0

## 2021-08-24 ENCOUNTER — HOSPITAL ENCOUNTER (OUTPATIENT)
Dept: REHABILITATION | Age: 51
Discharge: STILL ADMITTED OR ADMINISTRATIVE | End: 2021-08-24
Attending: STUDENT IN AN ORGANIZED HEALTH CARE EDUCATION/TRAINING PROGRAM

## 2021-08-24 PROCEDURE — 97116 GAIT TRAINING THERAPY: CPT | Performed by: PHYSICAL THERAPIST

## 2021-08-24 PROCEDURE — 97161 PT EVAL LOW COMPLEX 20 MIN: CPT | Performed by: PHYSICAL THERAPIST

## 2021-08-24 PROCEDURE — 97110 THERAPEUTIC EXERCISES: CPT | Performed by: PHYSICAL THERAPIST

## 2021-08-24 PROCEDURE — 10004173 HB COUNTER-THERAPY VISIT PT: Performed by: PHYSICAL THERAPIST

## 2021-08-24 PROCEDURE — 97112 NEUROMUSCULAR REEDUCATION: CPT | Performed by: PHYSICAL THERAPIST

## 2021-08-25 ENCOUNTER — TELEPHONE (OUTPATIENT)
Dept: NEUROLOGY | Age: 51
End: 2021-08-25

## 2021-08-25 ENCOUNTER — TELEPHONE (OUTPATIENT)
Dept: ADMINISTRATIVE | Age: 51
End: 2021-08-25

## 2021-08-25 DIAGNOSIS — S09.90XS TRAUMATIC INJURY OF HEAD, SEQUELA: Primary | ICD-10-CM

## 2021-08-31 ENCOUNTER — APPOINTMENT (OUTPATIENT)
Dept: REHABILITATION | Age: 51
End: 2021-08-31
Attending: STUDENT IN AN ORGANIZED HEALTH CARE EDUCATION/TRAINING PROGRAM

## 2021-08-31 ENCOUNTER — HOSPITAL ENCOUNTER (OUTPATIENT)
Dept: REHABILITATION | Age: 51
Discharge: STILL ADMITTED OR ADMINISTRATIVE | End: 2021-08-31
Attending: STUDENT IN AN ORGANIZED HEALTH CARE EDUCATION/TRAINING PROGRAM

## 2021-08-31 PROCEDURE — 97530 THERAPEUTIC ACTIVITIES: CPT | Performed by: PHYSICAL THERAPIST

## 2021-08-31 PROCEDURE — 10004173 HB COUNTER-THERAPY VISIT PT: Performed by: PHYSICAL THERAPIST

## 2021-08-31 ASSESSMENT — ENCOUNTER SYMPTOMS: PAIN SEVERITY NOW: 0

## 2021-09-14 ENCOUNTER — OFFICE VISIT (OUTPATIENT)
Dept: FAMILY MEDICINE | Age: 51
End: 2021-09-14

## 2021-09-14 ENCOUNTER — TELEPHONE (OUTPATIENT)
Dept: FAMILY MEDICINE | Age: 51
End: 2021-09-14

## 2021-09-14 ENCOUNTER — LAB SERVICES (OUTPATIENT)
Dept: LAB | Age: 51
End: 2021-09-14

## 2021-09-14 ENCOUNTER — TELEPHONE (OUTPATIENT)
Dept: SURGERY | Age: 51
End: 2021-09-14

## 2021-09-14 VITALS
HEIGHT: 65 IN | HEART RATE: 64 BPM | BODY MASS INDEX: 23.49 KG/M2 | RESPIRATION RATE: 16 BRPM | DIASTOLIC BLOOD PRESSURE: 82 MMHG | WEIGHT: 141 LBS | SYSTOLIC BLOOD PRESSURE: 118 MMHG

## 2021-09-14 DIAGNOSIS — Z01.812 PRE-PROCEDURAL LABORATORY EXAMINATION: Primary | ICD-10-CM

## 2021-09-14 DIAGNOSIS — Z12.11 SCREEN FOR COLON CANCER: ICD-10-CM

## 2021-09-14 DIAGNOSIS — L82.1 SEBORRHEIC KERATOSIS: ICD-10-CM

## 2021-09-14 DIAGNOSIS — Z00.00 ANNUAL PHYSICAL EXAM: Primary | ICD-10-CM

## 2021-09-14 DIAGNOSIS — Z76.89 ENCOUNTER TO ESTABLISH CARE: ICD-10-CM

## 2021-09-14 DIAGNOSIS — Z00.00 ANNUAL PHYSICAL EXAM: ICD-10-CM

## 2021-09-14 DIAGNOSIS — F17.200 TOBACCO USE DISORDER: ICD-10-CM

## 2021-09-14 DIAGNOSIS — Z12.11 ENCOUNTER FOR SCREENING FOR MALIGNANT NEOPLASM OF COLON: ICD-10-CM

## 2021-09-14 LAB
ALBUMIN SERPL-MCNC: 4.2 G/DL (ref 3.6–5.1)
ALBUMIN/GLOB SERPL: 1.3 {RATIO} (ref 1–2.4)
ALP SERPL-CCNC: 61 UNITS/L (ref 45–117)
ALT SERPL-CCNC: 31 UNITS/L
ANION GAP SERPL CALC-SCNC: 14 MMOL/L (ref 10–20)
AST SERPL-CCNC: 22 UNITS/L
BASOPHILS # BLD: 0 K/MCL (ref 0–0.3)
BASOPHILS NFR BLD: 1 %
BILIRUB SERPL-MCNC: 0.4 MG/DL (ref 0.2–1)
BUN SERPL-MCNC: 18 MG/DL (ref 6–20)
BUN/CREAT SERPL: 17 (ref 7–25)
CALCIUM SERPL-MCNC: 9.4 MG/DL (ref 8.4–10.2)
CHLORIDE SERPL-SCNC: 104 MMOL/L (ref 98–107)
CHOLEST SERPL-MCNC: 193 MG/DL
CHOLEST/HDLC SERPL: 3 {RATIO}
CO2 SERPL-SCNC: 31 MMOL/L (ref 21–32)
CREAT SERPL-MCNC: 1.07 MG/DL (ref 0.67–1.17)
DEPRECATED RDW RBC: 44.4 FL (ref 39–50)
EOSINOPHIL # BLD: 0.2 K/MCL (ref 0–0.5)
EOSINOPHIL NFR BLD: 3 %
ERYTHROCYTE [DISTWIDTH] IN BLOOD: 13 % (ref 11–15)
FASTING DURATION TIME PATIENT: ABNORMAL H
FASTING DURATION TIME PATIENT: NORMAL H
GFR SERPLBLD BASED ON 1.73 SQ M-ARVRAT: 81 ML/MIN
GLOBULIN SER-MCNC: 3.3 G/DL (ref 2–4)
GLUCOSE SERPL-MCNC: 101 MG/DL (ref 65–99)
HCT VFR BLD CALC: 48.3 % (ref 39–51)
HDLC SERPL-MCNC: 65 MG/DL
HGB BLD-MCNC: 15.8 G/DL (ref 13–17)
IMM GRANULOCYTES # BLD AUTO: 0 K/MCL (ref 0–0.2)
IMM GRANULOCYTES # BLD: 0 %
LDLC SERPL CALC-MCNC: 120 MG/DL
LYMPHOCYTES # BLD: 3 K/MCL (ref 1–4.8)
LYMPHOCYTES NFR BLD: 38 %
MCH RBC QN AUTO: 30.6 PG (ref 26–34)
MCHC RBC AUTO-ENTMCNC: 32.7 G/DL (ref 32–36.5)
MCV RBC AUTO: 93.4 FL (ref 78–100)
MONOCYTES # BLD: 0.7 K/MCL (ref 0.3–0.9)
MONOCYTES NFR BLD: 9 %
NEUTROPHILS # BLD: 3.9 K/MCL (ref 1.8–7.7)
NEUTROPHILS NFR BLD: 49 %
NONHDLC SERPL-MCNC: 128 MG/DL
NRBC BLD MANUAL-RTO: 0 /100 WBC
PLATELET # BLD AUTO: 227 K/MCL (ref 140–450)
POTASSIUM SERPL-SCNC: 5.8 MMOL/L (ref 3.4–5.1)
PROT SERPL-MCNC: 7.5 G/DL (ref 6.4–8.2)
RBC # BLD: 5.17 MIL/MCL (ref 4.5–5.9)
SODIUM SERPL-SCNC: 143 MMOL/L (ref 135–145)
TRIGL SERPL-MCNC: 42 MG/DL
TSH SERPL-ACNC: 1.29 MCUNITS/ML (ref 0.35–5)
WBC # BLD: 7.9 K/MCL (ref 4.2–11)

## 2021-09-14 PROCEDURE — 80050 GENERAL HEALTH PANEL: CPT | Performed by: INTERNAL MEDICINE

## 2021-09-14 PROCEDURE — 36415 COLL VENOUS BLD VENIPUNCTURE: CPT | Performed by: INTERNAL MEDICINE

## 2021-09-14 PROCEDURE — 80061 LIPID PANEL: CPT | Performed by: INTERNAL MEDICINE

## 2021-09-14 PROCEDURE — 99396 PREV VISIT EST AGE 40-64: CPT | Performed by: FAMILY MEDICINE

## 2021-09-14 ASSESSMENT — PATIENT HEALTH QUESTIONNAIRE - PHQ9
1. LITTLE INTEREST OR PLEASURE IN DOING THINGS: NOT AT ALL
CLINICAL INTERPRETATION OF PHQ2 SCORE: NO FURTHER SCREENING NEEDED
SUM OF ALL RESPONSES TO PHQ9 QUESTIONS 1 AND 2: 0
SUM OF ALL RESPONSES TO PHQ9 QUESTIONS 1 AND 2: 0
2. FEELING DOWN, DEPRESSED OR HOPELESS: NOT AT ALL
CLINICAL INTERPRETATION OF PHQ9 SCORE: NO FURTHER SCREENING NEEDED

## 2021-10-11 RX ORDER — BISACODYL 5 MG/1
TABLET, DELAYED RELEASE ORAL
Qty: 8 TABLET | Refills: 0 | Status: SHIPPED | OUTPATIENT
Start: 2021-10-11

## 2021-10-11 RX ORDER — MAGNESIUM CARB/ALUMINUM HYDROX 105-160MG
TABLET,CHEWABLE ORAL
Qty: 592 ML | Refills: 0 | Status: SHIPPED | OUTPATIENT
Start: 2021-10-11

## 2021-10-15 PROBLEM — Z12.11 SCREEN FOR COLON CANCER: Status: ACTIVE | Noted: 2021-10-15

## 2021-10-16 ENCOUNTER — LAB SERVICES (OUTPATIENT)
Dept: LAB | Age: 51
End: 2021-10-16

## 2021-10-16 DIAGNOSIS — Z01.812 PRE-PROCEDURAL LABORATORY EXAMINATION: ICD-10-CM

## 2021-10-16 PROCEDURE — U0005 INFEC AGEN DETEC AMPLI PROBE: HCPCS | Performed by: INTERNAL MEDICINE

## 2021-10-16 PROCEDURE — U0003 INFECTIOUS AGENT DETECTION BY NUCLEIC ACID (DNA OR RNA); SEVERE ACUTE RESPIRATORY SYNDROME CORONAVIRUS 2 (SARS-COV-2) (CORONAVIRUS DISEASE [COVID-19]), AMPLIFIED PROBE TECHNIQUE, MAKING USE OF HIGH THROUGHPUT TECHNOLOGIES AS DESCRIBED BY CMS-2020-01-R: HCPCS | Performed by: INTERNAL MEDICINE

## 2021-10-17 LAB
SARS-COV-2 RNA RESP QL NAA+PROBE: NOT DETECTED
SERVICE CMNT-IMP: NORMAL
SERVICE CMNT-IMP: NORMAL

## 2021-10-18 ENCOUNTER — ANESTHESIA EVENT (OUTPATIENT)
Dept: SURGERY | Age: 51
End: 2021-10-18

## 2021-10-19 ENCOUNTER — ANESTHESIA (OUTPATIENT)
Dept: SURGERY | Age: 51
End: 2021-10-19

## 2021-10-19 ENCOUNTER — HOSPITAL ENCOUNTER (OUTPATIENT)
Age: 51
Discharge: HOME | End: 2021-10-19
Attending: SURGERY | Admitting: SURGERY

## 2021-10-19 VITALS
RESPIRATION RATE: 17 BRPM | SYSTOLIC BLOOD PRESSURE: 120 MMHG | BODY MASS INDEX: 23.32 KG/M2 | HEIGHT: 65 IN | DIASTOLIC BLOOD PRESSURE: 85 MMHG | WEIGHT: 140 LBS | OXYGEN SATURATION: 98 % | TEMPERATURE: 96.8 F | HEART RATE: 67 BPM

## 2021-10-19 PROCEDURE — 10003428 HB COLONOSCOPY: Performed by: SURGERY

## 2021-10-19 PROCEDURE — 10002807 HB RX 258: Performed by: SURGERY

## 2021-10-19 PROCEDURE — 10002800 HB RX 250 W HCPCS: Performed by: NURSE ANESTHETIST, CERTIFIED REGISTERED

## 2021-10-19 PROCEDURE — 10004185 HB COUNTER-VISIT  CENSUS

## 2021-10-19 PROCEDURE — 10004316 HB COUNTER-PREP

## 2021-10-19 PROCEDURE — 10002801 HB RX 250 W/O HCPCS: Performed by: NURSE ANESTHETIST, CERTIFIED REGISTERED

## 2021-10-19 PROCEDURE — 10004348 HB COUNTER-EVAL PRE-OP

## 2021-10-19 PROCEDURE — 10002362 HB ANESTH MAC IV 1ST 1/2 HR: Performed by: SURGERY

## 2021-10-19 PROCEDURE — A45378 ANES COLONOSCOPY FLEX PROX SPLEN FLEX D: Performed by: NURSE ANESTHETIST, CERTIFIED REGISTERED

## 2021-10-19 PROCEDURE — G0121 COLON CA SCRN NOT HI RSK IND: HCPCS | Performed by: SURGERY

## 2021-10-19 RX ORDER — LIDOCAINE HYDROCHLORIDE 10 MG/ML
INJECTION, SOLUTION INFILTRATION; PERINEURAL PRN
Status: DISCONTINUED | OUTPATIENT
Start: 2021-10-19 | End: 2021-10-19

## 2021-10-19 RX ORDER — MIDAZOLAM HYDROCHLORIDE 1 MG/ML
2 INJECTION, SOLUTION INTRAMUSCULAR; INTRAVENOUS EVERY 5 MIN PRN
Status: DISCONTINUED | OUTPATIENT
Start: 2021-10-19 | End: 2021-10-19 | Stop reason: HOSPADM

## 2021-10-19 RX ORDER — ONDANSETRON 2 MG/ML
4 INJECTION INTRAMUSCULAR; INTRAVENOUS 2 TIMES DAILY PRN
Status: DISCONTINUED | OUTPATIENT
Start: 2021-10-19 | End: 2021-10-19 | Stop reason: HOSPADM

## 2021-10-19 RX ORDER — SODIUM CHLORIDE, SODIUM LACTATE, POTASSIUM CHLORIDE, CALCIUM CHLORIDE 600; 310; 30; 20 MG/100ML; MG/100ML; MG/100ML; MG/100ML
INJECTION, SOLUTION INTRAVENOUS CONTINUOUS
Status: DISCONTINUED | OUTPATIENT
Start: 2021-10-19 | End: 2021-10-19 | Stop reason: HOSPADM

## 2021-10-19 RX ORDER — DIPHENHYDRAMINE HYDROCHLORIDE 50 MG/ML
25 INJECTION INTRAMUSCULAR; INTRAVENOUS
Status: DISCONTINUED | OUTPATIENT
Start: 2021-10-19 | End: 2021-10-19 | Stop reason: HOSPADM

## 2021-10-19 RX ORDER — DEXTROSE MONOHYDRATE 25 G/50ML
25 INJECTION, SOLUTION INTRAVENOUS PRN
Status: DISCONTINUED | OUTPATIENT
Start: 2021-10-19 | End: 2021-10-19 | Stop reason: HOSPADM

## 2021-10-19 RX ORDER — LIDOCAINE HYDROCHLORIDE 10 MG/ML
5-10 INJECTION, SOLUTION INFILTRATION; PERINEURAL PRN
Status: DISCONTINUED | OUTPATIENT
Start: 2021-10-19 | End: 2021-10-19 | Stop reason: HOSPADM

## 2021-10-19 RX ORDER — DIPHENHYDRAMINE HYDROCHLORIDE 50 MG/ML
25 INJECTION INTRAMUSCULAR; INTRAVENOUS EVERY 4 HOURS PRN
Status: DISCONTINUED | OUTPATIENT
Start: 2021-10-19 | End: 2021-10-19 | Stop reason: HOSPADM

## 2021-10-19 RX ORDER — METOCLOPRAMIDE HYDROCHLORIDE 5 MG/ML
5-10 INJECTION INTRAMUSCULAR; INTRAVENOUS EVERY 6 HOURS PRN
Status: DISCONTINUED | OUTPATIENT
Start: 2021-10-19 | End: 2021-10-19 | Stop reason: HOSPADM

## 2021-10-19 RX ORDER — PROPOFOL 10 MG/ML
INJECTION, EMULSION INTRAVENOUS PRN
Status: DISCONTINUED | OUTPATIENT
Start: 2021-10-19 | End: 2021-10-19

## 2021-10-19 RX ORDER — DEXAMETHASONE SODIUM PHOSPHATE 4 MG/ML
10 INJECTION, SOLUTION INTRA-ARTICULAR; INTRALESIONAL; INTRAMUSCULAR; INTRAVENOUS; SOFT TISSUE
Status: DISCONTINUED | OUTPATIENT
Start: 2021-10-19 | End: 2021-10-19 | Stop reason: HOSPADM

## 2021-10-19 RX ORDER — HYDRALAZINE HYDROCHLORIDE 20 MG/ML
5-10 INJECTION INTRAMUSCULAR; INTRAVENOUS EVERY 10 MIN PRN
Status: DISCONTINUED | OUTPATIENT
Start: 2021-10-19 | End: 2021-10-19 | Stop reason: HOSPADM

## 2021-10-19 RX ORDER — PROCHLORPERAZINE EDISYLATE 5 MG/ML
5 INJECTION INTRAMUSCULAR; INTRAVENOUS EVERY 4 HOURS PRN
Status: DISCONTINUED | OUTPATIENT
Start: 2021-10-19 | End: 2021-10-19 | Stop reason: HOSPADM

## 2021-10-19 RX ORDER — 0.9 % SODIUM CHLORIDE 0.9 %
2 VIAL (ML) INJECTION EVERY 12 HOURS SCHEDULED
Status: DISCONTINUED | OUTPATIENT
Start: 2021-10-19 | End: 2021-10-19 | Stop reason: HOSPADM

## 2021-10-19 RX ADMIN — PROPOFOL 135 MCG/KG/MIN: 10 INJECTION, EMULSION INTRAVENOUS at 08:56

## 2021-10-19 RX ADMIN — SODIUM CHLORIDE, POTASSIUM CHLORIDE, SODIUM LACTATE AND CALCIUM CHLORIDE: 600; 310; 30; 20 INJECTION, SOLUTION INTRAVENOUS at 08:35

## 2021-10-19 RX ADMIN — PROPOFOL 30 MG: 10 INJECTION, EMULSION INTRAVENOUS at 09:04

## 2021-10-19 RX ADMIN — PROPOFOL 100 MG: 10 INJECTION, EMULSION INTRAVENOUS at 08:56

## 2021-10-19 RX ADMIN — LIDOCAINE HYDROCHLORIDE 50 MG: 10 INJECTION, SOLUTION INFILTRATION; PERINEURAL at 08:56

## 2021-10-19 ASSESSMENT — PAIN SCALES - GENERAL
PAINLEVEL_OUTOF10: 0

## 2021-10-19 ASSESSMENT — LIFESTYLE VARIABLES
PACK_YEARS: 17.5
SMOKING_YEARS: 35
SMOKING_STATUS: CURRENT

## 2021-10-19 ASSESSMENT — ACTIVITIES OF DAILY LIVING (ADL)
ADL_SCORE: 12
HISTORY OF FALLING IN THE LAST YEAR (PRIOR TO ADMISSION): NO
ADL_BEFORE_ADMISSION: INDEPENDENT
RECENT_DECLINE_ADL: NO
SENSORY_SUPPORT_DEVICES: EYEGLASSES
NEEDS_ASSIST: NO
ADL_SHORT_OF_BREATH: NO

## 2021-10-19 ASSESSMENT — COGNITIVE AND FUNCTIONAL STATUS - GENERAL
ARE YOU DEAF OR DO YOU HAVE SERIOUS DIFFICULTY  HEARING: NO
ARE YOU BLIND OR DO YOU HAVE SERIOUS DIFFICULTY SEEING, EVEN WHEN WEARING GLASSES: NO

## 2024-02-24 ENCOUNTER — HOSPITAL ENCOUNTER (OUTPATIENT)
Dept: ULTRASOUND IMAGING | Facility: HOSPITAL | Age: 54
Discharge: HOME | End: 2024-02-24
Attending: STUDENT IN AN ORGANIZED HEALTH CARE EDUCATION/TRAINING PROGRAM | Admitting: STUDENT IN AN ORGANIZED HEALTH CARE EDUCATION/TRAINING PROGRAM
Payer: COMMERCIAL

## 2024-02-24 ENCOUNTER — OFFICE VISIT (OUTPATIENT)
Dept: FAMILY MEDICINE | Facility: CLINIC | Age: 54
End: 2024-02-24
Payer: COMMERCIAL

## 2024-02-24 VITALS
TEMPERATURE: 97.9 F | OXYGEN SATURATION: 96 % | BODY MASS INDEX: 39.24 KG/M2 | SYSTOLIC BLOOD PRESSURE: 128 MMHG | RESPIRATION RATE: 18 BRPM | DIASTOLIC BLOOD PRESSURE: 82 MMHG | WEIGHT: 285.3 LBS | HEART RATE: 93 BPM

## 2024-02-24 DIAGNOSIS — M79.89 PAIN AND SWELLING OF RIGHT LOWER LEG: Primary | ICD-10-CM

## 2024-02-24 DIAGNOSIS — L03.115 CELLULITIS OF RIGHT LOWER EXTREMITY: ICD-10-CM

## 2024-02-24 DIAGNOSIS — M79.661 PAIN AND SWELLING OF RIGHT LOWER LEG: Primary | ICD-10-CM

## 2024-02-24 DIAGNOSIS — M79.661 PAIN AND SWELLING OF RIGHT LOWER LEG: ICD-10-CM

## 2024-02-24 DIAGNOSIS — M79.89 PAIN AND SWELLING OF RIGHT LOWER LEG: ICD-10-CM

## 2024-02-24 PROCEDURE — 99203 OFFICE O/P NEW LOW 30 MIN: CPT | Performed by: STUDENT IN AN ORGANIZED HEALTH CARE EDUCATION/TRAINING PROGRAM

## 2024-02-24 PROCEDURE — 93971 EXTREMITY STUDY: CPT | Mod: RT

## 2024-02-24 RX ORDER — DOXYCYCLINE 100 MG/1
100 CAPSULE ORAL 2 TIMES DAILY
Qty: 14 CAPSULE | Refills: 0 | Status: SHIPPED | OUTPATIENT
Start: 2024-02-24 | End: 2024-03-02

## 2024-02-24 NOTE — PROGRESS NOTES
Assessment & Plan     Cellulitis of right lower extremitys  Pain and swelling of right lower leg  Mauricio is a 4-year-old male who presents to urgent care for erythema and swelling of his right lower extremity.  He recently did travel by car for in excess of 5 to 6 hours without stopping.  The area is tender to palpation and red along with warmth.  We discussed differential including deep vein thrombosis, cellulitis, venous stasis.  We will plan for testing for DVT with right lower extremity ultrasound.  The right lower extremity ultrasound is negative at this time for DVT making a DVT or pulmonary embolism low on the differential. Given the clinical picture we will plan for treatment with antibiotics specifically doxycycline 100 mg twice a day for 1 week and monitor for resolution of symptoms.   Encourage patient to continue to monitor symptoms of increased  worsening/spreading infection and to follow up in 24 to 48 hours if there is no improvement, sooner if they experience increasing redness, swelling, red streaks, new fevers, new regional lymphadenopathy or new pain.  Additionally educated patient on pain relief using ibuprofen/tylenol and elevation    - US Lower Extremity Venous Duplex Right  - doxycycline hyclate (VIBRAMYCIN) 100 MG capsule  Dispense: 14 capsule; Refill: 0     25 minutes spent by me on the date of the encounter doing chart review, history and exam, documentation and further activities per the note. Billed based on complexity of care.     No follow-ups on file.    Rosemary Carter MD  [facility]     Mauricio is a 54 year old male who presents to clinic today for the following health issues:  Chief Complaint   Patient presents with    Leg Problem     Possible infection in Rt leg, swelling in Rt leg in April (never went away), having redness (inner calf and going up leg), warm to the touch, chills last night, has not checked temp (feels feverish last  night)     HPI    Concerned about possible infection on his right leg. Right foot has been swollen since last April. Leg was getting red and then had the chills badly and was shaking like when he had COVID the first. Couldn't find thermometer last night but is fairly certain that he had a fever. Redness happened all of a sudden and going up his leg. Worse last night. Denies numbness or tingling in the foot. No recent surgeries or procedures. 5-6 hour drive. No recent plane travel. Denies chest pain or shortness of breath. Reports some fatigue, a little bit lightheaded.     Review of Systems  Constitutional, HEENT, cardiovascular, pulmonary, gi and gu systems are negative, except as otherwise noted.      Objective    /82   Pulse 93   Temp 97.9  F (36.6  C) (Oral)   Resp 18   Wt 129.4 kg (285 lb 4.8 oz)   SpO2 96%   BMI 39.24 kg/m    Physical Exam   GENERAL: alert and no distress  NECK: no adenopathy, no asymmetry, masses, or scars  RESP: lungs clear to auscultation - no rales, rhonchi or wheezes  CV: regular rate and rhythm, normal S1 S2, no S3 or S4, no murmur, click or rub, no peripheral edema  MS: normal muscle tone, normal range of motion, no cyanosis, clubbing, or edema, 1+ non pitting  edema to calf, peripheral pulses normal, and tenderness to palpation over medial right lower extremity and 3 cm proximal to medial knee  SKIN: erythema - right medial lower legs  NEURO: Normal strength and tone, mentation intact and speech normal    US Lower Extremity Venous Duplex Right    Result Date: 2/24/2024  EXAM: US LOWER EXTREMITY VENOUS DUPLEX RIGHT LOCATION: [facility] DATE: 2/24/2024 INDICATION:  Pain and swelling of right lower leg, Pain and swelling of right lower leg COMPARISON: None. TECHNIQUE: Venous Duplex ultrasound of the right lower extremity with and without compression, augmentation and duplex. Color flow and spectral Doppler with waveform analysis performed. FINDINGS:  Exam includes the common femoral, femoral, popliteal, and contralateral common femoral veins as well as segmentally visualized deep calf veins and greater saphenous vein. RIGHT: No deep vein thrombosis. No superficial thrombophlebitis. No popliteal cyst.     IMPRESSION: 1.  No deep venous thrombosis in the right lower extremity.

## 2024-06-10 ENCOUNTER — OFFICE VISIT (OUTPATIENT)
Dept: INTERNAL MEDICINE | Facility: CLINIC | Age: 54
End: 2024-06-10
Payer: COMMERCIAL

## 2024-06-10 ENCOUNTER — DOCUMENTATION ONLY (OUTPATIENT)
Dept: VASCULAR SURGERY | Facility: CLINIC | Age: 54
End: 2024-06-10

## 2024-06-10 VITALS
OXYGEN SATURATION: 97 % | RESPIRATION RATE: 18 BRPM | SYSTOLIC BLOOD PRESSURE: 122 MMHG | WEIGHT: 293.25 LBS | BODY MASS INDEX: 39.72 KG/M2 | HEIGHT: 72 IN | TEMPERATURE: 98.2 F | DIASTOLIC BLOOD PRESSURE: 80 MMHG | HEART RATE: 74 BPM

## 2024-06-10 DIAGNOSIS — R60.0 LOWER EXTREMITY EDEMA: ICD-10-CM

## 2024-06-10 DIAGNOSIS — Z12.11 SPECIAL SCREENING FOR MALIGNANT NEOPLASMS, COLON: Primary | ICD-10-CM

## 2024-06-10 PROCEDURE — 99213 OFFICE O/P EST LOW 20 MIN: CPT | Performed by: NURSE PRACTITIONER

## 2024-06-10 NOTE — PROGRESS NOTES
Vascular Referral Intake    Referred by: DR. Young for lower extremity edema    Specialty: Vascular Medicine    Specific Provider if Necessary:  MD Álvaro Rick    Visit Type: Vascular Medicine    Time Frame: No Preference    Testing/Imaging Needed Before Consult: none    Appt Note: (to be pasted into appt comments, also add where additional information is located, ie, outside images pushed to PACS, in Epic, sent to [facility], etc)  Right lower extremity swelling x13 months. DVT negative.

## 2024-06-10 NOTE — PROGRESS NOTES
Assessment & Plan     Lower extremity edema  This is only affecting his right lower extremity.  He has had an ultrasound, earlier this spring for DVT evaluation which was negative.    I suspect he has some sort of venous insufficiency.  I recommended he be evaluated at our vascular clinic  - Vascular Medicine Referral; Future    Special screening for malignant neoplasms, colon  - Colonoscopy Screening  Referral; Future          BMI  Estimated body mass index is 39.77 kg/m  as calculated from the following:    Height as of this encounter: 1.829 m (6').    Weight as of this encounter: 133 kg (293 lb 4 oz).             Subjective   Mauricio is a 54 year old, presenting for the following health issues:    Foot Swelling (Right foot since end of April of last year /Was on medication antibiotics for infection on leg )      6/10/2024     1:36 PM   Additional Questions   Roomed by JACY العلي     Right lower extremity swelling for the last 13 months or so.  Does not hurt.  He is not currently using any medications.                  Objective    Pulse 74   Temp 98.2  F (36.8  C) (Oral)   Resp 18   Ht 1.829 m (6')   Wt 133 kg (293 lb 4 oz)   SpO2 97%   BMI 39.77 kg/m    Body mass index is 39.77 kg/m .  Physical Exam   +1 pitting edema right lower extremity              Signed Electronically by: Cristiano Young, AGUSTÍN

## 2024-07-24 ENCOUNTER — OFFICE VISIT (OUTPATIENT)
Dept: VASCULAR SURGERY | Facility: CLINIC | Age: 54
End: 2024-07-24
Attending: NURSE PRACTITIONER
Payer: COMMERCIAL

## 2024-07-24 VITALS
OXYGEN SATURATION: 99 % | SYSTOLIC BLOOD PRESSURE: 133 MMHG | HEART RATE: 81 BPM | DIASTOLIC BLOOD PRESSURE: 78 MMHG | RESPIRATION RATE: 16 BRPM

## 2024-07-24 DIAGNOSIS — I89.0 LYMPHEDEMA: Primary | ICD-10-CM

## 2024-07-24 DIAGNOSIS — R60.0 LOWER EXTREMITY EDEMA: ICD-10-CM

## 2024-07-24 PROCEDURE — 99203 OFFICE O/P NEW LOW 30 MIN: CPT | Performed by: HOSPITALIST

## 2024-07-24 PROCEDURE — 99213 OFFICE O/P EST LOW 20 MIN: CPT | Performed by: HOSPITALIST

## 2024-07-24 ASSESSMENT — PAIN SCALES - GENERAL: PAINLEVEL: NO PAIN (0)

## 2024-07-24 NOTE — PROGRESS NOTES
VASCULAR MEDICINE CONSULT NOTE          LOCATION:  [facility]         Date of Service: 7/24/2024      Primary Care Provider: Michele Sheffield  Referring provider;  Cirstiano Young      Reason for the visit/chief complaint:   Right foot swelling      HPI:  Mauricio Thorpe is a pleasant 54 year old male who presents to our Vascular Medicine clinic for the above mentioned reason.     Mr. Thorpe has no significant medical history apart from obesity class ll with BMI 39.7.  Slightly over a year ago, he developed right foot swelling that appeared suddenly while he was on a work trip.  It is only confined to the foot with no extension in the leg.  No swelling on the left.  This was not associated with any pain. No change with elevation. The swelling continued since that time.  He then tried over-the-counter compression stockings but did not feel this helped therefore, stopped using it.   Earlier this year in February, he started noticing redness and was seen at an urgent care on 2/24/2024.  DVT was ruled out with venous duplex ultrasound. He was treated with doxycycline for cellulitis with resolution of the symptoms. Per patient, his swelling also improved slightly after the antibiotics but continued. He then started wearing compression stocking again but with no noticeable improvement.    He was seen by family medicine last month who referred the patient to our clinic for further evaluation.    Mr. Thorpe denies previous history of DVT, varicose veins or superficial vein thrombosis to both lower extremities.  Denies trauma to the right foot or lower extremity.  Denies pelvic surgery, radiation or lymph node removal.  Denies new abdominal distention.  He has not had screening colonoscopy yet but did have colonoscopy approximately 10 years ago for what appears to be hematochezia.  Per his report, this was negative.  No recurrent hematochezia since then.  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  Denies family history of lower extremity swelling, lymphedema or venous insufficiency.  No smoking history      REVIEW OF SYSTEMS:    A 12 point ROS was reviewed and is negative except what is mentioned in HPI.       Past medical history, surgical history, medications, family history, social history and allergies were reviewed. Pertinent points mentioned under HPI.        OBJECTIVE:    Vital signs:  /78   Pulse 81   Resp 16   SpO2 99%   Wt Readings from Last 1 Encounters:   06/10/24 293 lb 4 oz (133 kg)     There is no height or weight on file to calculate BMI.    Physical exam:  General appearance: Pleasant male in no apparent distress.    HEENT: NC/AT.    Neck: Carotids +2/2 bilaterally without bruits.  No jugular venous distension.   Heart: RRR. Normal S1, S2. No murmur, rub, click, or gallop.   Chest: Clear to auscultation bilaterally.  Abdomen: Obese.  Soft, nontender, nondistended. No palpable mass.  Extremities: Right foot with nonpitting edema confined to the foot with positive Stemmer sign.  No swelling noted on the left lower extremity.  No tenderness.  No palpable lymph node noted on the right popliteal or groin area.  Palpable DP and PT 2/2 bilaterally.  Some skin thickening noted on the right foot.  No stasis dermatitis, lipodermatosclerosis or skin wound.  Neurological: Alert, awake and oriented           DIAGNOSTIC STUDIES:   Labs and diagnostics reviewed including outside records. Pertinent points are mentioned under HPI and assessment and plan sections.        ASSESSMENT AND PLAN:    Right foot lymphedema stage ll  Obesity class ll, BMI 39.7    Mr. Thorpe's symptoms are consistent with lymphedema of the right foot.  We spent some time discussing lymphedema risk factors, etiologies such as primary versus secondary, diagnosis and treatment.    Given his age, primary lymphedema is less likely but not excluded.  He does not have a clear  etiology for secondary lymphedema apart from obesity.  I doubt that he has significant venous insufficiency given lack of symptoms.  His swelling is only confined to the toes and foot with no involvement of the ankle or calf, does not improve with elevation and is not associated with any other symptoms.  We will obtain NM lymphoscintigraphy to further evaluate if there is any lymphatic obstruction versus slow flow.  We will refer him to lymphedema therapy to start complete decongestive therapy.  We discussed continuing with compression stocking but prescribed new knee-high 20-30 mmHg graduated compression stocking for him.    Recommendations:  Referral to lymphedema therapy for CDT.  Start wearing knee-high medical graduated 20-30 mmHg compression stocking  Obtain NM lymphoscintigraphy.  Encouraged him to complete age-appropriate cancer screening.  Due for colonoscopy that is currently ordered by PCP.  Follow-up in 6 weeks.    It was a pleasure meeting Mr. Thorpe in our clinic today.    Álvaro Rick MD  Vascular Medicine  July 24, 2024

## 2024-07-24 NOTE — PATIENT INSTRUCTIONS
Leidy Martínez,    Thank you for entrusting your care with us today. After your visit today with MD Álvaro Rick this is the plan that was discussed at your appointment.    Go to lymphedema therapy. If you do not hear from them in the next couple of business days, please call them to schedule.      Get a lymphoscintigraphy scan.  If you do not hear from them to schedule this, please call [phone] to schedule.     Wear graduated compression stockings 20-30mmHg daily.  See below for more information on use.     Follow up with Dr. Rick in September as scheduled.     I am including additional information on these things and our contact information if you have any questions or concerns.   Please do not hesitate to reach out to us if you felt we did not answer your questions or you are unsure of the treatment plan after your visit today. Our number is [phone].Thank you for trusting us with your care.         Again thank you for your time.     Wear your compression stockings every day; put them on first thing in the morning and remove at bedtime    Replace your compression stockings every 6 months; these garments will lose their elasticity and become ineffective    Hang your stockings, do not put them in the dryer.  This will help prolong the life of your compressions stockings.     .

## 2024-07-24 NOTE — PROGRESS NOTES
[facility]        Patient is here for a consult to discuss RLE swelling in just his foot. The patient states he/she does  wear compressions on RLE. Gets mild discomfort when swelling is worsened.  Patient states he has an infection in his RLE in February and has worn compression ever since.  Swelling is observed in right foot.    Pt is currently taking no meds that would impact our treatment plan.    /78   Pulse 81   Resp 16   SpO2 99%     The provider has been notified that the patient has concerns of right foot swelling.     Questions patient would like addressed today are: N/A.    Refills are needed: N/A    Has homecare services and agency name:  No

## 2024-08-05 NOTE — PROGRESS NOTES
PHYSICAL THERAPY EVALUATION  Type of Visit: Evaluation       Fall Risk Screen:  Fall screen completed by: PT  Have you fallen 2 or more times in the past year?: No  Have you fallen and had an injury in the past year?: No  Is patient a fall risk?: No    Subjective   Pt is a 54 year-old man with chief complaint right foot swelling. He has tried compression socks, but it doesn't reduce the size. He had one cellulitis infection earlier this year. 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 Swelling is confined to below the right knee - ankle/foot.         Presenting condition or subjective complaint: lymphadema  Date of onset:      Relevant medical history:     Dates & types of surgery: appendix    Prior diagnostic imaging/testing results:       Prior therapy history for the same diagnosis, illness or injury: No      Prior Level of Function  Transfers: Independent  Ambulation: Independent  ADL: Independent    Living Environment  Social support: With a significant other or spouse   Type of home: House   Stairs to enter the home:         Ramp: No   Stairs inside the home: Yes 10 Is there a railing: Yes     Help at home: None  Equipment owned:       Employment: Yes    Hobbies/Interests:      Patient goals for therapy: not to have to wear compression socks    Pain assessment: Pain denied     Objective       EDEMA EVALUATION  Additional history:  Body part affected by edema: lymphedema in right foot  If cancer related, treatment:    If not cancer related, problems with veins or cause of swelling:    Distance able to walk: 4 miles  Time able to stand:    Sensation problems in hands/feet: No    Edema etiology: Unknown        Cognitive Status Examination  Orientation: Oriented to person, place and time   Level of Consciousness: Alert  Follows Commands and Answers Questions: 100% of the time  Personal Safety and Judgement: Intact  Memory: Intact    EDEMA  Skin Condition: Pitting - 3+  in right foot, slight/1+ in B lower legs  Scar: No  Capillary Refill: Symmetrical  Radial Pulse:   Dorsal Pedal Pulse: Symmetrical  Stemmer Sign: + on right only  Ulceration: No    GIRTH MEASUREMENTS: Refer to separate girth measurement flowsheet.     RANGE OF MOTION: Slight decrease in ROM at right toes and ankle d/t skin tightness  STRENGTH: B LE WNL    BED MOBILITY: Independent  TRANSFERS: Independent  GAIT/LOCOMOTION: Indep, no deviations      Assessment & Plan   CLINICAL IMPRESSIONS  Medical Diagnosis: Lymphedema    Treatment Diagnosis: Lymphedema   Impression/Assessment: Pt is a 54 year-old man with chief complaint right foot/ankle swelling. He has unknown etiology of swelling (it may be primary lymphedema) with previous R LE cellulitis infection, 3+ pitting in R foot and slight pitting in B lower legs. He has used compression socks, but doesn't notice much change. Pt would like to work on decongestive therapy at home, rather than coming in to the clinic consistently. He was educated in compression bandaging today and would benefit from instruction in self MLD and new compression garments (possibly a toe cap).     Clinical Decision Making (Complexity):  Clinical Presentation: Stable/Uncomplicated  Clinical Presentation Rationale: based on medical and personal factors listed in PT evaluation  Clinical Decision Making (Complexity): Low complexity    PLAN OF CARE  Treatment Interventions:  Interventions: Gait Training, Manual Therapy, Neuromuscular Re-education, Therapeutic Activity, Therapeutic Exercise, Self-Care/Home Management    Long Term Goals     PT Goal 1  Goal Identifier: Home management  Goal Description: Pt will be independent with home management of swelling including skin care/precautions, exercise, self MLD and wrapping and use of compression.  Target Date: 10/01/24      Frequency of Treatment: 1x/week  Duration of Treatment: 3-5 visits      Risks and benefits of evaluation/treatment have been  explained.   Patient/Family/caregiver agrees with Plan of Care.     Evaluation Time:     PT Ayan, Low Complexity Minutes (83905): 22       Signing Clinician: Sarah Escobar PT

## 2024-08-06 ENCOUNTER — THERAPY VISIT (OUTPATIENT)
Dept: PHYSICAL THERAPY | Facility: REHABILITATION | Age: 54
End: 2024-08-06
Attending: HOSPITALIST
Payer: COMMERCIAL

## 2024-08-06 DIAGNOSIS — I89.0 LYMPHEDEMA: ICD-10-CM

## 2024-08-06 PROCEDURE — 97161 PT EVAL LOW COMPLEX 20 MIN: CPT | Mod: GP | Performed by: PHYSICAL THERAPIST

## 2024-08-06 PROCEDURE — 97140 MANUAL THERAPY 1/> REGIONS: CPT | Mod: GP | Performed by: PHYSICAL THERAPIST

## 2024-08-06 PROCEDURE — 97535 SELF CARE MNGMENT TRAINING: CPT | Mod: GP | Performed by: PHYSICAL THERAPIST

## 2024-09-03 ENCOUNTER — HOSPITAL ENCOUNTER (OUTPATIENT)
Dept: NUCLEAR MEDICINE | Facility: HOSPITAL | Age: 54
Discharge: HOME | End: 2024-09-03
Attending: HOSPITALIST | Admitting: HOSPITALIST
Payer: COMMERCIAL

## 2024-09-03 DIAGNOSIS — I89.0 LYMPHEDEMA: ICD-10-CM

## 2024-09-03 PROCEDURE — 78195 LYMPH SYSTEM IMAGING: CPT

## 2024-09-03 PROCEDURE — 343N000001 HC RX 343: Performed by: HOSPITALIST

## 2024-09-03 PROCEDURE — A9541 TC99M SULFUR COLLOID: HCPCS | Performed by: HOSPITALIST

## 2024-09-03 RX ORDER — TECHNETIUM TC 99M SULFUR COLLOID 2 MG
.2-1.5 KIT MISCELLANEOUS ONCE
Status: COMPLETED | OUTPATIENT
Start: 2024-09-03 | End: 2024-09-03

## 2024-09-03 RX ADMIN — TECHNETIUM TC 99M SULFUR COLLOID 1 MILLICURIE: KIT at 07:27

## 2024-09-03 RX ADMIN — TECHNETIUM TC 99M SULFUR COLLOID 1 MILLICURIE: KIT at 07:34

## 2024-09-04 ENCOUNTER — OFFICE VISIT (OUTPATIENT)
Dept: FAMILY MEDICINE | Facility: CLINIC | Age: 54
End: 2024-09-04
Payer: COMMERCIAL

## 2024-09-04 ENCOUNTER — TELEPHONE (OUTPATIENT)
Dept: VASCULAR SURGERY | Facility: CLINIC | Age: 54
End: 2024-09-04
Payer: COMMERCIAL

## 2024-09-04 VITALS
SYSTOLIC BLOOD PRESSURE: 125 MMHG | RESPIRATION RATE: 24 BRPM | HEART RATE: 103 BPM | OXYGEN SATURATION: 96 % | DIASTOLIC BLOOD PRESSURE: 81 MMHG | TEMPERATURE: 101 F

## 2024-09-04 DIAGNOSIS — L03.90 CELLULITIS, UNSPECIFIED CELLULITIS SITE: Primary | ICD-10-CM

## 2024-09-04 PROCEDURE — 99213 OFFICE O/P EST LOW 20 MIN: CPT | Performed by: FAMILY MEDICINE

## 2024-09-04 RX ORDER — DOXYCYCLINE 100 MG/1
100 CAPSULE ORAL 2 TIMES DAILY
Qty: 20 CAPSULE | Refills: 0 | Status: SHIPPED | OUTPATIENT
Start: 2024-09-04 | End: 2024-09-14

## 2024-09-04 NOTE — TELEPHONE ENCOUNTER
Caller: Mauricio    Provider: MD Álvaro Rick    Detailed reason for call: Mauricio is calling stating he had a procedure yesterday and developed a fever and chills and this morning noticed the back of his leg is red and warm.  He is concerned about an infection, this happened back in Feb.    Best phone number to contact: [phone]    Best time to contact: any    Ok to leave a detailed message: Yes    Ok to speak to authorized person if needed: No      (Noted to patient if reason is related to wound or incision, to please send a photo via email or [employer].)

## 2024-09-04 NOTE — PROGRESS NOTES
"Assessment & Plan     Cellulitis, unspecified cellulitis site  Right leg cellulitis  - doxycycline hyclate (VIBRAMYCIN) 100 MG capsule  Dispense: 20 capsule; Refill: 0             No follow-ups on file.    Mike Weiss MD  [facility]    Luis Greene is a 54 year old male who presents to clinic today for the following health issues:  Chief Complaint   Patient presents with    Derm Problem     Rt leg inner and back below calf x last night but this morning red and warm to the touch. Pt states had a procedure done 2/2024. No pain or tender. Pt states \"rapidly decapitating\". Little dizzy. Tylenol + Ibuprofen last dose 0200 today.       HPI    Concern about right leg infection.  Has some procedure yesterday.  Fever  Warm to touch  No ain  Red leg on right.        Review of Systems        Objective    /81   Pulse 103   Temp (!) 101  F (38.3  C) (Tympanic)   Resp 24   SpO2 96%   Physical Exam  Vitals and nursing note reviewed.   Constitutional:       Appearance: Normal appearance.   Skin:     Comments: Red right leg on inside of the leg  Large area   Neurological:      Mental Status: He is alert.                    "

## 2024-09-04 NOTE — TELEPHONE ENCOUNTER
Called pt and based on s/sx of infection instructed him to go to urgent care. He voiced understanding.

## 2024-09-17 ENCOUNTER — VIRTUAL VISIT (OUTPATIENT)
Dept: VASCULAR SURGERY | Facility: CLINIC | Age: 54
End: 2024-09-17
Attending: HOSPITALIST
Payer: COMMERCIAL

## 2024-09-17 VITALS
DIASTOLIC BLOOD PRESSURE: 78 MMHG | SYSTOLIC BLOOD PRESSURE: 113 MMHG | RESPIRATION RATE: 16 BRPM | HEART RATE: 79 BPM | WEIGHT: 280 LBS | OXYGEN SATURATION: 97 % | BODY MASS INDEX: 37.93 KG/M2 | HEIGHT: 72 IN | TEMPERATURE: 98.1 F

## 2024-09-17 DIAGNOSIS — I89.0 LYMPHEDEMA OF EXTREMITY: Primary | ICD-10-CM

## 2024-09-17 PROCEDURE — 99213 OFFICE O/P EST LOW 20 MIN: CPT | Mod: 95 | Performed by: HOSPITALIST

## 2024-09-17 ASSESSMENT — PAIN SCALES - GENERAL: PAINLEVEL: NO PAIN (0)

## 2024-09-17 NOTE — PATIENT INSTRUCTIONS
Leidy Martínez,    Thank you for entrusting your care with us today. After your visit today with MD Álvaro Rick this is the plan that was discussed at your appointment.    Wear graduated compression stockings 30-40mmHg daily.  See below for more information.     Go to lymphedema therapy to discuss how to do manual lymphatic drainage.  [phone]    Follow up in about 4 months.      I am including additional information on these things and our contact information if you have any questions or concerns.   Please do not hesitate to reach out to us if you felt we did not answer your questions or you are unsure of the treatment plan after your visit today. Our number is [phone].Thank you for trusting us with your care.         Again thank you for your time.

## 2024-09-17 NOTE — PROGRESS NOTES
[facility]        Patient is here for a follow up  to discuss Lymphedema. The patient states he/she does  wear compressions. Swelling is observed in right lower extremity. Patient has had cellulitis in his leg once since his last visit.     Pt is currently taking no meds that would impact our treatment plan.    /78   Pulse 79   Temp 98.1  F (36.7  C)   Resp 16   Ht 6' (1.829 m)   Wt 280 lb (127 kg)   SpO2 97%   BMI 37.97 kg/m      The provider has been notified that the patient has concerns of hx of infectio in RLE.     Questions patient would like addressed today are: N/A.    Refills are needed: No    Has homecare services and agency name:  No

## 2024-09-21 ENCOUNTER — HEALTH MAINTENANCE LETTER (OUTPATIENT)
Age: 54
End: 2024-09-21

## (undated) DEVICE — MMIS - KIT UNIVERSAL GI OSHKOSH

## (undated) DEVICE — MMIS - KIT PROC DISP VLV CNCT ENDO CARRY-ON DEFENDO